# Patient Record
Sex: MALE | Race: WHITE | URBAN - METROPOLITAN AREA
[De-identification: names, ages, dates, MRNs, and addresses within clinical notes are randomized per-mention and may not be internally consistent; named-entity substitution may affect disease eponyms.]

---

## 2024-10-23 ENCOUNTER — HOSPITAL ENCOUNTER (EMERGENCY)
Age: 39
Discharge: ANOTHER HOSPITAL | End: 2024-10-23
Attending: EMERGENCY MEDICINE
Payer: COMMERCIAL

## 2024-10-23 VITALS
RESPIRATION RATE: 16 BRPM | OXYGEN SATURATION: 98 % | SYSTOLIC BLOOD PRESSURE: 163 MMHG | DIASTOLIC BLOOD PRESSURE: 93 MMHG | TEMPERATURE: 96 F | HEART RATE: 51 BPM | WEIGHT: 315 LBS

## 2024-10-23 DIAGNOSIS — R56.9 SEIZURE (HCC): Primary | ICD-10-CM

## 2024-10-23 PROCEDURE — 96374 THER/PROPH/DIAG INJ IV PUSH: CPT

## 2024-10-23 PROCEDURE — 6360000002 HC RX W HCPCS: Performed by: EMERGENCY MEDICINE

## 2024-10-23 PROCEDURE — 99284 EMERGENCY DEPT VISIT MOD MDM: CPT

## 2024-10-23 RX ORDER — LORAZEPAM 2 MG/ML
2 INJECTION INTRAMUSCULAR ONCE
Status: COMPLETED | OUTPATIENT
Start: 2024-10-23 | End: 2024-10-23

## 2024-10-23 RX ADMIN — LORAZEPAM 2 MG: 2 INJECTION INTRAMUSCULAR; INTRAVENOUS at 05:43

## 2024-10-23 ASSESSMENT — PAIN - FUNCTIONAL ASSESSMENT: PAIN_FUNCTIONAL_ASSESSMENT: WONG-BAKER FACES

## 2024-10-23 ASSESSMENT — PAIN SCALES - WONG BAKER: WONGBAKER_NUMERICALRESPONSE: NO HURT

## 2024-10-23 NOTE — ED PROVIDER NOTES
I PERSONALLY SAW THE PATIENT AND PERFORMED A SUBSTANTIVE PORTION OF THE VISIT INCLUDING ALL ASPECTS OF THE MEDICAL DECISION MAKING PROCESS.    [facility] EMERGENCY DEPT  EMERGENCY DEPARTMENT ENCOUNTER      Pt Name: Reyes Liu  MRN: [id number]  Birthdate 1985  Date of evaluation: 10/23/2024  Provider: Matt Land MD    CHIEF COMPLAINT       Chief Complaint   Patient presents with    Seizures     Pt arrives from home for c/o multiple seizures. Hospice patient with active DNR d/t diagnosis of brain CA       HISTORY OF PRESENT ILLNESS    Reyes Liu is a 39 y.o. male who presents to the emergency department with multiple seizures.  Patient multiple seizures today.  Hospice patient.  DNR CC.  Home hospice nurse recommended patient come to the emergency room to have seizures stopped as they did not medications at home.  DNR CC at bedside.  History otherwise limited as above.    Nursing Notes were reviewed. Including nursing noted for FM, Surgical History, Past Medical History, Social History, vitals, and allergies; agree with all.     REVIEW OF SYSTEMS       Review of Systems    Except as noted above the remainder of the review of systems was reviewed and negative.     PAST MEDICAL HISTORY   No past medical history on file.    SURGICAL HISTORY     No past surgical history on file.    CURRENT MEDICATIONS       Previous Medications    No medications on file       ALLERGIES     Patient has no allergy information on record.    FAMILY HISTORY      No family history on file.    SOCIAL HISTORY       Social History     Socioeconomic History    Marital status:      Social Determinants of Health     Food Insecurity: No Food Insecurity (10/18/2024)    Received from [facility]    Hunger Vital Sign     Worried About Running Out of Food in the Last Year: Never true     Ran Out of Food in the Last Year: Never true   Transportation Needs: No Transportation Needs (10/18/2024)    Received from [facility]    PRAPAR

## 2024-10-23 NOTE — PROGRESS NOTES
This Rn Spoke with family and updated them with plan of care at this time. Hospice spoke with charge RN and informed him that they would be here between 830-9 to evaluate patient. Nursing supervisor contacted at this time to request a comfort care cart for family. Family declines other needs at this time

## 2024-10-23 NOTE — ED NOTES
This RN spoke with Viridiana PEDROZA from Hospice and informed her of last medications given and that transport was requested but no time established yet. Viridiana PEDROZA requested call when transport time was established. This RN agreed to notify her directly when information is available

## 2024-10-23 NOTE — ED NOTES
Report given to [facility] transport at this time. Family aware of transport arrival. Pt continues to rest comfortably. Respirations easy and unlabored at this time. Pt remains unresponsive with patent airway. VSS.

## 2024-10-23 NOTE — ED TRIAGE NOTES
Pt arrives from home for c/o multiple seizures. Hospice patient with active DNR d/t diagnosis of brain CA